# Patient Record
Sex: MALE | Race: WHITE | NOT HISPANIC OR LATINO | Employment: STUDENT | URBAN - METROPOLITAN AREA
[De-identification: names, ages, dates, MRNs, and addresses within clinical notes are randomized per-mention and may not be internally consistent; named-entity substitution may affect disease eponyms.]

---

## 2023-06-22 ENCOUNTER — OFFICE VISIT (OUTPATIENT)
Dept: PRIMARY CARE | Facility: CLINIC | Age: 18
End: 2023-06-22
Payer: MEDICAID

## 2023-06-22 VITALS — SYSTOLIC BLOOD PRESSURE: 104 MMHG | TEMPERATURE: 97.7 F | WEIGHT: 224 LBS | DIASTOLIC BLOOD PRESSURE: 66 MMHG

## 2023-06-22 DIAGNOSIS — J31.0 RHINITIS MEDICAMENTOSA: Primary | ICD-10-CM

## 2023-06-22 DIAGNOSIS — T48.5X5A RHINITIS MEDICAMENTOSA: Primary | ICD-10-CM

## 2023-06-22 PROCEDURE — 99212 OFFICE O/P EST SF 10 MIN: CPT | Performed by: FAMILY MEDICINE

## 2023-06-22 RX ORDER — FLUTICASONE PROPIONATE 50 MCG
2 SPRAY, SUSPENSION (ML) NASAL DAILY
Qty: 16 G | Refills: 11 | Status: SHIPPED | OUTPATIENT
Start: 2023-06-22 | End: 2023-07-22

## 2023-06-22 RX ORDER — AMOXICILLIN AND CLAVULANATE POTASSIUM 875; 125 MG/1; MG/1
1 TABLET, FILM COATED ORAL 2 TIMES DAILY
Qty: 20 TABLET | Refills: 0 | COMMUNITY
Start: 2023-06-11 | End: 2023-06-22 | Stop reason: ALTCHOICE

## 2023-06-22 NOTE — PROGRESS NOTES
Subjective   Patient ID: [id number]     Nikky Gama is a 17 y.o. male who presents for Sinusitis.    HPI  6  to 8 weeks of nasal congestion, with some green nasal mucous;  smokes weed every other day;  sore throat on the right side only that has only been present for the last two weeks  Review of Systems  General-denies weakness or myalgias; no unexplained fever or chills  OPTH-No dry eyes, itchy eyes, or blurry vision   ENT-No tinnitus or vertigo; no sneezing;  no facial pain;  muffled hearing in the left ear for 4 weeks;  was using afrin for at least two weeks  NECK-no stiffness, swelling or pain  PULM-has a dry cough only in the morning    Objective     /66 (BP Location: Left arm, Patient Position: Sitting)   Temp 36.5 °C (97.7 °F) (Oral)   Wt 102 kg (224 lb)      Physical Exam  Eyes-pupils equal round, reactive to light and accommodation, fundi with normal cup/disc ratio, conjunctiva without redness or discharge  General- well defined, well nourished, well hydrated individual in NAD  Skin- normal color and turgor; without nail pitting or cyanosis  Head-normocephalic without masses or tenderness  Ears-normal pinnae, and canals, with otosclerosis ,normal landmarks  and light reflex of tympanic membranes bilaterally and tympanotomy tube in left tympanic membrane only  Nose-septum in the midline, pale, congested violaceous mucosa bilaterally  Throat- without erythema or exudate, uvula in midlineNeck-supple without lymphadenopathy or thyromegaly; no carotid bruits  Heart- regular rate and rhythm, normal s1 and s2 without murmur or gallop  Lungs-clear to auscultation    Assessment/Plan     Problem List Items Addressed This Visit    None  Visit Diagnoses       Rhinitis medicamentosa    -  Primary    Relevant Medications    fluticasone (Flonase) 50 mcg/actuation nasal spray        Return in two weeks to be reevaluated.    Cortez Lu MD

## 2023-07-05 PROBLEM — R73.9 HYPERGLYCEMIA: Status: ACTIVE | Noted: 2023-07-05

## 2023-07-05 PROBLEM — H52.13 MYOPIA OF BOTH EYES: Status: ACTIVE | Noted: 2023-07-05

## 2023-07-05 PROBLEM — H90.0 CONDUCTIVE HEARING LOSS OF BOTH EARS: Status: ACTIVE | Noted: 2023-07-05

## 2023-07-05 PROBLEM — T61.781A ALLERGIC REACTION TO SHELLFISH: Status: ACTIVE | Noted: 2023-07-05

## 2023-07-05 PROBLEM — Z96.22 BILATERAL PATENT PRESSURE EQUALIZATION (PE) TUBES: Status: ACTIVE | Noted: 2023-02-02

## 2023-07-05 PROBLEM — Z96.22 MYRINGOTOMY TUBE(S) STATUS: Status: ACTIVE | Noted: 2023-07-05

## 2023-07-05 PROBLEM — R94.128 ABNORMAL TYMPANOGRAM: Status: ACTIVE | Noted: 2023-07-05

## 2023-07-05 PROBLEM — T78.1XXA ADVERSE FOOD REACTION: Status: ACTIVE | Noted: 2023-07-05

## 2023-07-05 PROBLEM — E55.9 VITAMIN D DEFICIENCY: Status: ACTIVE | Noted: 2023-07-05

## 2023-07-05 PROBLEM — R79.89 ELEVATED LIVER FUNCTION TESTS: Status: ACTIVE | Noted: 2023-07-05

## 2023-07-06 ENCOUNTER — OFFICE VISIT (OUTPATIENT)
Dept: PRIMARY CARE | Facility: CLINIC | Age: 18
End: 2023-07-06
Payer: MEDICAID

## 2023-07-06 VITALS — TEMPERATURE: 97.7 F | SYSTOLIC BLOOD PRESSURE: 120 MMHG | WEIGHT: 225 LBS | DIASTOLIC BLOOD PRESSURE: 70 MMHG

## 2023-07-06 DIAGNOSIS — H65.22 LEFT CHRONIC SEROUS OTITIS MEDIA: ICD-10-CM

## 2023-07-06 DIAGNOSIS — Z96.22 PATENT PRESSURE EQUALIZATION (PE) TUBE ON RIGHT SIDE: Primary | ICD-10-CM

## 2023-07-06 PROBLEM — R94.128 ABNORMAL TYMPANOGRAM: Status: RESOLVED | Noted: 2023-07-05 | Resolved: 2023-07-06

## 2023-07-06 PROCEDURE — 99213 OFFICE O/P EST LOW 20 MIN: CPT | Performed by: FAMILY MEDICINE

## 2023-07-06 RX ORDER — AZITHROMYCIN 250 MG/1
TABLET, FILM COATED ORAL
Qty: 6 TABLET | Refills: 0 | Status: SHIPPED | OUTPATIENT
Start: 2023-07-06 | End: 2023-07-11

## 2023-07-06 RX ORDER — METHYLPREDNISOLONE 4 MG/1
TABLET ORAL
Qty: 21 TABLET | Refills: 0 | Status: SHIPPED | OUTPATIENT
Start: 2023-07-06

## 2023-07-06 ASSESSMENT — PATIENT HEALTH QUESTIONNAIRE - PHQ9
1. LITTLE INTEREST OR PLEASURE IN DOING THINGS: NOT AT ALL
2. FEELING DOWN, DEPRESSED OR HOPELESS: NOT AT ALL
SUM OF ALL RESPONSES TO PHQ9 QUESTIONS 1 AND 2: 0

## 2023-07-06 NOTE — PROGRESS NOTES
Subjective   Patient ID: [id number]     Nikky Gama is a 17 y.o. male who presents for Follow-up (2 week).    HPI  Used the Flonase for two days before accidentally leaving it in [place];  feeling better even though he is still slightly congested;  now states that he was NOT using Afrin previously and was always using Fluticasone for a nasal spray    Review of Systems  General-denies weakness or myalgias; no unexplained fever or chills  OPTH-No dry eyes, itchy eyes, or blurry vision   ENT-No tinnitus or vertigo;  has a little clear nasal mucous;  still with plugged ears  NECK-no stiffness, swelling or pain  ALLERGY-no sneezing or itching    Objective     /70 (BP Location: Left arm, Patient Position: Sitting)   Temp 36.5 °C (97.7 °F)   Wt 102 kg (225 lb)      Physical Exam  Eyes-pupils equal round, reactive to light and accommodation, fundi with normal cup/disc ratio, conjunctiva without redness or discharge  General- well defined, well nourished, well hydrated individual in NAD  Skin- normal color and turgor; without nail pitting  Head-normocephalic without masses or tenderness  Ears-normal pinnae, and canals, with normal landmarks but dull light reflex of left tympanic membrane with myringotomy tube in canal;  right tympanostomy tube is in the drum  Nose-septum in the midline, normal mucosa bilaterally  Throat- without erythema or exudate, uvula in midlineNeck-supple without lymphadenopathy or thyromegaly; no carotid bruits  Heart- regular rate and rhythm, normal s1 and s2 without murmur or gallop  Lungs-clear to auscultation  Abdomen-soft, positive bowel sounds, without masses, HSmegaly or pain   Assessment/Plan     Problem List Items Addressed This Visit    None  Visit Diagnoses       Patent pressure equalization (PE) tube on right side    -  Primary    Left chronic serous otitis media        Relevant Medications    azithromycin (Zithromax) 250 mg tablet    methylPREDNISolone (Medrol Dospak) 4 mg  tablets        Never use Afrin for more than three consecutive days.  Use the Fluticasone nasal spray for nasal congestion and sneezing.  I will put you on Medrol dosepak and an antibiotic in anticipation of your ENT consult.     Cortez Lu MD

## 2023-07-06 NOTE — PATIENT INSTRUCTIONS
Never use Afrin for more than three consecutive days.  Use the Fluticasone nasal spray for nasal congestion and sneezing.  I will put you on Medrol dosepak and an antibiotic in anticipation of your ENT consult.

## 2025-07-15 ENCOUNTER — APPOINTMENT (OUTPATIENT)
Dept: OPHTHALMOLOGY | Facility: CLINIC | Age: 20
End: 2025-07-15
Payer: MEDICAID

## 2025-07-15 DIAGNOSIS — H52.31 ANISOMETROPIA: Primary | ICD-10-CM

## 2025-07-15 DIAGNOSIS — Z86.69 HX OF RETINAL DETACHMENT: ICD-10-CM

## 2025-07-15 DIAGNOSIS — H52.13 MYOPIA OF BOTH EYES: ICD-10-CM

## 2025-07-15 PROCEDURE — 92014 COMPRE OPH EXAM EST PT 1/>: CPT | Performed by: OPTOMETRIST

## 2025-07-15 PROCEDURE — 92015 DETERMINE REFRACTIVE STATE: CPT | Performed by: OPTOMETRIST

## 2025-07-15 PROCEDURE — 92310 CONTACT LENS FITTING OU: CPT | Performed by: OPTOMETRIST

## 2025-07-15 PROCEDURE — V2520 CONTACT LENS HYDROPHILIC: HCPCS | Performed by: OPTOMETRIST

## 2025-07-15 ASSESSMENT — REFRACTION_WEARINGRX
OS_AXIS: 140
OD_SPHERE: -3.25
OS_CYLINDER: -0.75
OD_AXIS: 013
OS_SPHERE: -7.25
OD_CYLINDER: -0.25

## 2025-07-15 ASSESSMENT — REFRACTION_CURRENTRX
OD_CYLINDER: SPHERE
OD_BASECURVE: 8.3
OS_DIAMETER: 14.2
OD_BRAND: PRECISION 1
OS_BASECURVE: 8.3
OD_SPHERE: -3.50
OS_CYLINDER: SPHERE
OS_CYLINDER: SPHERE
OD_DIAMETER: 14.2
OS_DIAMETER: 14.2
OD_BASECURVE: 8.3
OS_BRAND: PRECISION 1
OS_SPHERE: -6.50
OD_BRAND: PRECISION 1
OS_BASECURVE: 8.3
OD_CYLINDER: SPHERE
OD_DIAMETER: 14.2
OD_SPHERE: -3.50
OS_BRAND: PRECISION 1
OS_SPHERE: -7.00

## 2025-07-15 ASSESSMENT — VISUAL ACUITY
METHOD: SNELLEN - LINEAR
VA_OR_OD_CURRENT_RX: 20/20
OD_CC: 20/20
VA_OR_OS_CURRENT_RX: 20/20-2
OS_CC+: -1
OD_CC+: -1
OS_CC: 20/20
CORRECTION_TYPE: GLASSES

## 2025-07-15 ASSESSMENT — REFRACTION_MANIFEST
OS_AXIS: 135
OD_SPHERE: -3.50
OD_SPHERE: -3.25
OD_AXIS: 175
OS_AXIS: 160
METHOD_AUTOREFRACTION: 1
OD_AXIS: 175
OD_CYLINDER: -0.50
OD_CYLINDER: -0.25

## 2025-07-15 ASSESSMENT — ENCOUNTER SYMPTOMS
CONSTITUTIONAL NEGATIVE: 0
ALLERGIC/IMMUNOLOGIC NEGATIVE: 0
RESPIRATORY NEGATIVE: 0
CARDIOVASCULAR NEGATIVE: 0
NEUROLOGICAL NEGATIVE: 0
ENDOCRINE NEGATIVE: 0
MUSCULOSKELETAL NEGATIVE: 0
HEMATOLOGIC/LYMPHATIC NEGATIVE: 0
EYES NEGATIVE: 0
GASTROINTESTINAL NEGATIVE: 0
PSYCHIATRIC NEGATIVE: 0

## 2025-07-15 ASSESSMENT — TONOMETRY
OD_IOP_MMHG: 14
IOP_METHOD: GOLDMANN APPLANATION
OS_IOP_MMHG: 14

## 2025-07-15 ASSESSMENT — CONF VISUAL FIELD
METHOD: COUNTING FINGERS
OS_SUPERIOR_TEMPORAL_RESTRICTION: 0
OS_INFERIOR_NASAL_RESTRICTION: 0
OD_SUPERIOR_NASAL_RESTRICTION: 0
OD_NORMAL: 1
OS_INFERIOR_TEMPORAL_RESTRICTION: 0
OD_INFERIOR_NASAL_RESTRICTION: 0
OS_SUPERIOR_NASAL_RESTRICTION: 0
OD_SUPERIOR_TEMPORAL_RESTRICTION: 0
OS_NORMAL: 1
OD_INFERIOR_TEMPORAL_RESTRICTION: 0

## 2025-07-15 ASSESSMENT — CUP TO DISC RATIO
OD_RATIO: .2
OS_RATIO: .2

## 2025-07-15 ASSESSMENT — EXTERNAL EXAM - RIGHT EYE: OD_EXAM: NORMAL

## 2025-07-15 ASSESSMENT — EXTERNAL EXAM - LEFT EYE: OS_EXAM: NORMAL

## 2025-07-15 NOTE — Clinical Note
Apply for Prior authorization anisometropia after scleral buckle surgery Left eye, if approved Both eyes (OU) Contact Lens Order  Quantity: 2 Package: 90 PK Appointment needed? No Medically necessary? Yes Ship To: Home Additional instructions: order final RX

## 2025-07-15 NOTE — PROGRESS NOTES
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    Follow up with Dr Yun annually - appt made

## 2025-07-16 ASSESSMENT — REFRACTION
OD_AXIS: 175
OS_SPHERE: -6.50
OS_AXIS: 135
OD_SPHERE: -3.25
OS_CYLINDER: -0.50
OD_CYLINDER: -0.50

## 2025-07-16 ASSESSMENT — SLIT LAMP EXAM - LIDS: COMMENTS: GOOD POSITION, MILD MGD

## 2025-07-16 ASSESSMENT — REFRACTION_MANIFEST
OS_SPHERE: -7.25
OS_SPHERE: -6.50
OS_CYLINDER: -0.75
OS_CYLINDER: -0.50

## 2025-07-22 ENCOUNTER — APPOINTMENT (OUTPATIENT)
Dept: OPHTHALMOLOGY | Facility: CLINIC | Age: 20
End: 2025-07-22
Payer: MEDICAID

## 2025-09-29 ENCOUNTER — APPOINTMENT (OUTPATIENT)
Dept: OPHTHALMOLOGY | Facility: CLINIC | Age: 20
End: 2025-09-29
Payer: MEDICAID